# Patient Record
Sex: FEMALE
[De-identification: names, ages, dates, MRNs, and addresses within clinical notes are randomized per-mention and may not be internally consistent; named-entity substitution may affect disease eponyms.]

---
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
## 2021-05-16 ENCOUNTER — HOSPITAL ENCOUNTER (EMERGENCY)
Dept: HOSPITAL 46 - ED | Age: 21
Discharge: HOME | End: 2021-05-16
Payer: COMMERCIAL

## 2021-05-16 VITALS — HEIGHT: 62 IN | BODY MASS INDEX: 21.16 KG/M2 | WEIGHT: 114.99 LBS

## 2021-05-16 DIAGNOSIS — R11.0: ICD-10-CM

## 2021-05-16 DIAGNOSIS — F41.9: Primary | ICD-10-CM

## 2021-05-16 DIAGNOSIS — Z79.899: ICD-10-CM

## 2021-05-16 DIAGNOSIS — T43.225A: ICD-10-CM

## 2021-10-15 ENCOUNTER — HOSPITAL ENCOUNTER (EMERGENCY)
Dept: HOSPITAL 46 - ED | Age: 21
LOS: 1 days | Discharge: HOME | End: 2021-10-16
Payer: COMMERCIAL

## 2021-10-15 VITALS — HEIGHT: 62 IN | WEIGHT: 117 LBS | BODY MASS INDEX: 21.53 KG/M2

## 2021-10-15 DIAGNOSIS — K42.9: Primary | ICD-10-CM

## 2021-10-15 DIAGNOSIS — K59.00: ICD-10-CM

## 2021-10-15 DIAGNOSIS — Z79.899: ICD-10-CM

## 2021-10-15 DIAGNOSIS — M41.9: ICD-10-CM

## 2022-11-27 ENCOUNTER — HOSPITAL ENCOUNTER (EMERGENCY)
Dept: HOSPITAL 46 - ED | Age: 22
Discharge: HOME | End: 2022-11-27
Payer: COMMERCIAL

## 2022-11-27 VITALS — HEIGHT: 62 IN | WEIGHT: 137.06 LBS | BODY MASS INDEX: 25.22 KG/M2

## 2022-11-27 DIAGNOSIS — K21.9: ICD-10-CM

## 2022-11-27 DIAGNOSIS — O99.613: Primary | ICD-10-CM

## 2022-11-27 DIAGNOSIS — Z3A.35: ICD-10-CM

## 2022-11-27 DIAGNOSIS — Z20.822: ICD-10-CM

## 2022-11-27 PROCEDURE — U0003 INFECTIOUS AGENT DETECTION BY NUCLEIC ACID (DNA OR RNA); SEVERE ACUTE RESPIRATORY SYNDROME CORONAVIRUS 2 (SARS-COV-2) (CORONAVIRUS DISEASE [COVID-19]), AMPLIFIED PROBE TECHNIQUE, MAKING USE OF HIGH THROUGHPUT TECHNOLOGIES AS DESCRIBED BY CMS-2020-01-R: HCPCS

## 2022-11-27 NOTE — EKG
[facility]
                                    [address] [place] [address]
                                  [place], [place]  [address]
_________________________________________________________________________________________
                                                                 Signed   
 
 
Normal sinus rhythm
Normal ECG
When compared with ECG of 11-AUG-2020 15:35,
No significant change was found
Confirmed by RENNY LÓPEZ MD ([id number]) on 11/27/2022 8:07:26 PM
 
 
 
 
 
 
 
 
 
 
 
 
 
 
 
 
 
 
 
 
 
 
 
 
 
 
 
 
 
 
 
 
 
 
 
 
 
 
 
 
    Electronically Signed By: RENNY LÓPEZ MD  11/27/22 2007
_________________________________________________________________________________________
PATIENT NAME:     MI ENGLISH CAROLE                
MEDICAL RECORD #: [id number]                     Electrocardiogram             
          ACCT #: [id number]  
DATE OF BIRTH:   [birth year]                                       
PHYSICIAN:   RENNY LÓPEZ MD                   REPORT #: [id number]
REPORT IS CONFIDENTIAL AND NOT TO BE RELEASED WITHOUT AUTHORIZATION

## 2022-12-15 ENCOUNTER — HOSPITAL ENCOUNTER (INPATIENT)
Dept: HOSPITAL 46 - FBCO | Age: 22
LOS: 2 days | Discharge: HOME | End: 2022-12-17
Attending: OBSTETRICS & GYNECOLOGY | Admitting: OBSTETRICS & GYNECOLOGY
Payer: COMMERCIAL

## 2022-12-15 DIAGNOSIS — Z20.822: ICD-10-CM

## 2022-12-15 DIAGNOSIS — Z67.10: ICD-10-CM

## 2022-12-15 DIAGNOSIS — Z3A.38: ICD-10-CM

## 2022-12-15 DIAGNOSIS — F32.A: ICD-10-CM

## 2022-12-15 DIAGNOSIS — F41.9: ICD-10-CM

## 2022-12-15 DIAGNOSIS — F12.90: ICD-10-CM

## 2022-12-15 DIAGNOSIS — D64.9: ICD-10-CM

## 2022-12-15 PROCEDURE — 0DQR0ZZ REPAIR ANAL SPHINCTER, OPEN APPROACH: ICD-10-PCS | Performed by: OBSTETRICS & GYNECOLOGY

## 2022-12-15 PROCEDURE — 10907ZC DRAINAGE OF AMNIOTIC FLUID, THERAPEUTIC FROM PRODUCTS OF CONCEPTION, VIA NATURAL OR ARTIFICIAL OPENING: ICD-10-PCS | Performed by: OBSTETRICS & GYNECOLOGY

## 2022-12-15 PROCEDURE — 3E0R3BZ INTRODUCTION OF ANESTHETIC AGENT INTO SPINAL CANAL, PERCUTANEOUS APPROACH: ICD-10-PCS | Performed by: OBSTETRICS & GYNECOLOGY

## 2022-12-15 PROCEDURE — 00HU33Z INSERTION OF INFUSION DEVICE INTO SPINAL CANAL, PERCUTANEOUS APPROACH: ICD-10-PCS | Performed by: OBSTETRICS & GYNECOLOGY

## 2022-12-15 PROCEDURE — U0003 INFECTIOUS AGENT DETECTION BY NUCLEIC ACID (DNA OR RNA); SEVERE ACUTE RESPIRATORY SYNDROME CORONAVIRUS 2 (SARS-COV-2) (CORONAVIRUS DISEASE [COVID-19]), AMPLIFIED PROBE TECHNIQUE, MAKING USE OF HIGH THROUGHPUT TECHNOLOGIES AS DESCRIBED BY CMS-2020-01-R: HCPCS

## 2022-12-15 PROCEDURE — C9803 HOPD COVID-19 SPEC COLLECT: HCPCS

## 2022-12-15 PROCEDURE — A9270 NON-COVERED ITEM OR SERVICE: HCPCS

## 2022-12-16 NOTE — PR
[facility]
                                    [address]
                                  [place], [place]  [address]
_________________________________________________________________________________________
                                                                 Signed   
 
 
===================================
PP Progress Notes
===================================
Datetime Report Generated by CPN: 2022 10:09
   
SUBJECTIVE:  [id number]
Pain:  Within Normal Limits
Nausea/Vomiting:  Denies
Vital Signs:  [id number]
Vital Signs:  Reviewed; Within Normal Limits
Cardiovascular:  Not Done
Respiratory:  Not Done
Abdomen/Uterus:  Abnormal
Lochia:  Normal
Vulva/Perineum:  Not Done
Breasts:  Not Done
CVA Tenderness:  Not Done
Extremities:  Normal
 Incision:  Not Applicable
Breastfeeding Progress:  Abnormal
Exam Comments:  Fundus firm, NT @ U-2.
      
   H/H 8.4/25.9, WBC 17.9, plat 124k
IMPRESSION/PLAN/PROCEDURES:  [id number]
Impression:  Normal Postpartum Progression
Other Impression:  anemia
Plan:  Breastfeeding Consult
Progress Notes:  Doing well and tolerating her anemia so far.  
Signing Physician:  Crystal Juárez MD
 
 
Copies:                                
~
 
 
 
 
 
 
 
 
 
 
*Electronically Signed*  22  1009  CRYSTAL JUÁREZ MD            
                                                                       
_________________________________________________________________________________________
PATIENT NAME:     MI ENGLISH               
MEDICAL RECORD #: [id number]                     PROGRESS NOTE                 
          ACCT #: [id number]  
DATE OF BIRTH:   [birth year]                                       
PHYSICIAN:   CRYSTAL JUÁREZ MD                   RPT #: [id number]
REPORT IS CONFIDENTIAL AND NOT TO BE RELEASED WITHOUT AUTHORIZATION

## 2022-12-17 NOTE — PR
[facility]
                                    [address]
                                  [place], [place]  [address]
_________________________________________________________________________________________
                                                                 Signed   
 
 
===================================
PP Progress Notes
===================================
Datetime Report Generated by CPEVELYNE: 2022 10:34
   
SUBJECTIVE:  [id number]
Pain:  Within Normal Limits
Nausea/Vomiting:  Denies
Bowel Movement:  Yes
Vital Signs:  [id number]
Vital Signs:  Reviewed; Within Normal Limits
Cardiovascular:  Not Done
Respiratory:  Not Done
Abdomen/Uterus:  Abnormal
Lochia:  Normal
Vulva/Perineum:  Not Done
Breasts:  Not Done
CVA Tenderness:  Not Done
Extremities:  Normal
 Incision:  Not Applicable
Breastfeeding Progress:  Normal
Exam Comments:  Fundus firm, NT @ U-1
IMPRESSION/PLAN/PROCEDURES:  [id number]
Impression:  Normal Postpartum Progression
Other Impression:  anemia
Plan:  Discharge
Procedures:  None
Progress Notes:  Doing well.  She is ready for D/C.
Signing Physician:  Crystal Juárez MD
 
 
Copies:                                
~
 
 
 
 
 
 
 
 
 
 
*Electronically Signed*  22  1034  CRYSTAL JUÁREZ MD            
                                                                       
_________________________________________________________________________________________
PATIENT NAME:     MI [place]               
MEDICAL RECORD #: [id number]                     PROGRESS NOTE                 
          ACCT #: [id number]  
DATE OF BIRTH:   [birth year]                                       
PHYSICIAN:   CRYSTAL JUÁREZ MD                   RPT #: [id number]
REPORT IS CONFIDENTIAL AND NOT TO BE RELEASED WITHOUT AUTHORIZATION